# Patient Record
Sex: MALE | Race: WHITE | NOT HISPANIC OR LATINO | URBAN - METROPOLITAN AREA
[De-identification: names, ages, dates, MRNs, and addresses within clinical notes are randomized per-mention and may not be internally consistent; named-entity substitution may affect disease eponyms.]

---

## 2024-08-09 ENCOUNTER — EMERGENCY (EMERGENCY)
Facility: HOSPITAL | Age: 19
LOS: 1 days | Discharge: HOME | End: 2024-08-09
Attending: STUDENT IN AN ORGANIZED HEALTH CARE EDUCATION/TRAINING PROGRAM | Admitting: STUDENT IN AN ORGANIZED HEALTH CARE EDUCATION/TRAINING PROGRAM
Payer: SELF-PAY

## 2024-08-09 VITALS
HEART RATE: 79 BPM | OXYGEN SATURATION: 98 % | TEMPERATURE: 98 F | RESPIRATION RATE: 18 BRPM | SYSTOLIC BLOOD PRESSURE: 126 MMHG | DIASTOLIC BLOOD PRESSURE: 65 MMHG

## 2024-08-09 VITALS
DIASTOLIC BLOOD PRESSURE: 68 MMHG | TEMPERATURE: 98 F | HEART RATE: 68 BPM | RESPIRATION RATE: 18 BRPM | OXYGEN SATURATION: 95 % | SYSTOLIC BLOOD PRESSURE: 128 MMHG

## 2024-08-09 DIAGNOSIS — S67.197A CRUSHING INJURY OF LEFT LITTLE FINGER, INITIAL ENCOUNTER: ICD-10-CM
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
## 2024-08-09 DIAGNOSIS — S62.637B DISPLACED FRACTURE OF DISTAL PHALANX OF LEFT LITTLE FINGER, INITIAL ENCOUNTER FOR OPEN FRACTURE: ICD-10-CM

## 2024-08-09 DIAGNOSIS — Z23 ENCOUNTER FOR IMMUNIZATION: ICD-10-CM

## 2024-08-09 PROCEDURE — 99284 EMERGENCY DEPT VISIT MOD MDM: CPT

## 2024-08-09 PROCEDURE — 90471 IMMUNIZATION ADMIN: CPT

## 2024-08-09 PROCEDURE — 99284 EMERGENCY DEPT VISIT MOD MDM: CPT | Mod: 25

## 2024-08-09 PROCEDURE — 73140 X-RAY EXAM OF FINGER(S): CPT | Mod: 26,LT

## 2024-08-09 PROCEDURE — 90715 TDAP VACCINE 7 YRS/> IM: CPT

## 2024-08-09 PROCEDURE — 73140 X-RAY EXAM OF FINGER(S): CPT

## 2024-08-09 RX ORDER — CEPHALEXIN 250 MG
500 CAPSULE ORAL ONCE
Refills: 0 | Status: COMPLETED | OUTPATIENT
Start: 2024-08-09 | End: 2024-08-09

## 2024-08-09 RX ORDER — CLOSTRIDIUM TETANI TOXOID ANTIGEN (FORMALDEHYDE INACTIVATED), CORYNEBACTERIUM DIPHTHERIAE TOXOID ANTIGEN (FORMALDEHYDE INACTIVATED), BORDETELLA PERTUSSIS TOXOID ANTIGEN (GLUTARALDEHYDE INACTIVATED), BORDETELLA PERTUSSIS FILAMENTOUS HEMAGGLUTININ ANTIGEN (FORMALDEHYDE INACTIVATED), BORDETELLA PERTUSSIS PERTACTIN ANTIGEN, AND BORDETELLA PERTUSSIS FIMBRIAE 2/3 ANTIGEN 5; 2; 2.5; 5; 3; 5 [LF]/.5ML; [LF]/.5ML; UG/.5ML; UG/.5ML; UG/.5ML; UG/.5ML
0.5 INJECTION, SUSPENSION INTRAMUSCULAR ONCE
Refills: 0 | Status: COMPLETED | OUTPATIENT
Start: 2024-08-09 | End: 2024-08-09

## 2024-08-09 RX ORDER — LIDOCAINE HYDROCHLORIDE,EPINEPHRINE BITARTRATE 20; .005 MG/ML; MG/ML
20 INJECTION, SOLUTION EPIDURAL; INFILTRATION; INTRACAUDAL; PERINEURAL ONCE
Refills: 0 | Status: COMPLETED | OUTPATIENT
Start: 2024-08-09 | End: 2024-08-09

## 2024-08-09 RX ORDER — CEPHALEXIN 250 MG
1 CAPSULE ORAL
Qty: 40 | Refills: 0
Start: 2024-08-09 | End: 2024-08-18

## 2024-08-09 RX ADMIN — CLOSTRIDIUM TETANI TOXOID ANTIGEN (FORMALDEHYDE INACTIVATED), CORYNEBACTERIUM DIPHTHERIAE TOXOID ANTIGEN (FORMALDEHYDE INACTIVATED), BORDETELLA PERTUSSIS TOXOID ANTIGEN (GLUTARALDEHYDE INACTIVATED), BORDETELLA PERTUSSIS FILAMENTOUS HEMAGGLUTININ ANTIGEN (FORMALDEHYDE INACTIVATED), BORDETELLA PERTUSSIS PERTACTIN ANTIGEN, AND BORDETELLA PERTUSSIS FIMBRIAE 2/3 ANTIGEN 0.5 MILLILITER(S): 5; 2; 2.5; 5; 3; 5 INJECTION, SUSPENSION INTRAMUSCULAR at 16:26

## 2024-08-09 RX ADMIN — Medication 500 MILLIGRAM(S): at 16:25

## 2024-08-09 RX ADMIN — LIDOCAINE HYDROCHLORIDE,EPINEPHRINE BITARTRATE 20 MILLILITER(S): 20; .005 INJECTION, SOLUTION EPIDURAL; INFILTRATION; INTRACAUDAL; PERINEURAL at 21:01

## 2024-08-09 NOTE — ED PROVIDER NOTE - PROGRESS NOTE DETAILS
S/p ortho hand evaluation with plan for antibiotics and outpatient followup.  Plan to discharge home with return precautions and outpatient followup.

## 2024-08-09 NOTE — ED PROVIDER NOTE - NSFOLLOWUPCLINICS_GEN_ALL_ED_FT
[facility]  Family Medicine  [address], [address]  [place], [place] [address]  Phone: [phone]  Fax:

## 2024-08-09 NOTE — ED PROVIDER NOTE - OBJECTIVE STATEMENT
20 y/o M with no significant PMHx presents with crushing injury to the R 5th digit approx 22:00 last night (8/8/24). Patient was out to eat with friends and went to lean up against a metal fence that was surrounding the outdoor seating area at the restaurant. The fence was unstable and he fell backward and pinched his R 5th digit under the fence. No head-strike, no LOC. Patient cleaned wound at home and went to bed. Endorses associated pain of R 5th digit, minimal bleeding from finger. Did not want to go to urgent care/ED at first because he currently does not have active health insurance 2/2 living in [place] the last 3 years. Patient decided to go this morning to urgent care. X-ray showed comminuted significantly displaced fx of the distal phalanx. Patient denies numbness/tingling of the RUE, headache, dizziness, changes in vision, chest pain, shortness of breath, back pain, changes in bowel or bladder habits. Unknown last tetanus shot. 20 y/o M with no significant PMHx presents with crushing injury to the L 5th digit approx 22:00 last night (8/8/24). Patient was out to eat with friends and went to lean up against a metal fence that was surrounding the outdoor seating area at the restaurant. The fence was unstable and he fell backward and pinched his R 5th digit under the fence. No head-strike, no LOC. Patient cleaned wound at home and went to bed. Endorses associated pain of L 5th digit, minimal bleeding from finger. Did not want to go to urgent care/ED at first because he currently does not have active health insurance 2/2 living in [place] the last 3 years. Patient decided to go this morning to urgent care. X-ray showed comminuted significantly displaced fx of the distal phalanx of the 5th digit. Patient denies numbness/tingling of the LUE, headache, dizziness, changes in vision, chest pain, shortness of breath, back pain, changes in bowel or bladder habits. Unknown last tetanus shot.

## 2024-08-09 NOTE — ED PROVIDER NOTE - ATTENDING APP SHARED VISIT CONTRIBUTION OF CARE
19M sent with by urgent care s/p crush injury to R finger, with nail avulsion and distal finger laceration, XR at urgent care with displaced fracture, will treat as open fracture and discuss case with hand team.

## 2024-08-09 NOTE — ED ADULT NURSE REASSESSMENT NOTE - NS ED NURSE REASSESS COMMENT FT1
Laceration to left hand distal end of 5th digit, bleeding controlled.  Vital signs stble.  Cephalexin 500mg PO adminsitered,  ADACEL IM adminitered.  Xray done.  Ortho consult and laceration repair pending.

## 2024-08-09 NOTE — ED ADULT NURSE NOTE - OBJECTIVE STATEMENT
Patient w/ laceration to left hand distal end of 5th digit, states last night was at a bar and leaned against some metal divider, fell and cut finger.  Arrives w/ dressing in place from , sent to ED.  States UTD on Tetanus vaccine status.

## 2024-08-09 NOTE — ED ADULT NURSE NOTE - NSFALLUNIVINTERV_ED_ALL_ED
Bed/Stretcher in lowest position, wheels locked, appropriate side rails in place/Call bell, personal items and telephone in reach/Instruct patient to call for assistance before getting out of bed/chair/stretcher/Non-slip footwear applied when patient is off stretcher/[place] to call system/Physically safe environment - no spills, clutter or unnecessary equipment/Purposeful proactive rounding/Room/bathroom lighting operational, light cord in reach

## 2024-08-09 NOTE — ED PROVIDER NOTE - NSFOLLOWUPINSTRUCTIONS_ED_ALL_ED_FT
FOLLOW UP IN THE ORTHOPEDICS CLINIC.  CALL [phone] TO SCHEDULE AN APPOINTMENT.    ----    Finger Fracture, Adult  A finger fracture is a break in any of the finger bones. Your health care provider may put a splint or cast on your finger so it will not move while it heals.    What are the causes?  The main cause of a finger fracture is an injury from:  Sports.  A fall.  Closing your finger in a drawer or door.  What increases the risk?  Playing sports.  Working with machines.  Having a condition called osteoporosis that causes weak bones.  What are the signs or symptoms?  Pain, bruises, and swelling soon after the injury.  Stiffness.  Exposed bones, in very bad cases. This is called a compound fracture or open fracture.  How is this diagnosed?  A physical exam.  Your medical history.  Your symptoms.  An X-ray.  How is this treated?  Treatment depends on how bad your fracture is. If the bones are still in place, your provider may put your finger in a splint. If many fingers are fractured, you may need a cast. A cast may be placed up to the elbow. This will keep your fingers and hand from moving.    If the bones are out of place, your provider may move them back into place or you may need to have surgery.    You may also need to do physical therapy exercises to help your finger move better and get stronger.    Follow these instructions at home:  If you have a splint that can be taken off:    Hand showing finger splint on fingers and wrist.  Wear the splint as told by your provider. Take it off only if your provider says you can.  Check the skin around it every day. Tell your provider if you see problems.  Loosen the splint if your fingers tingle, are numb, or turn cold and blue.  Keep the splint clean and dry.  If you have a cast that cannot be taken off:    Do not put pressure on any part of the cast until it's hard. This may take a few hours.  Do not stick anything inside it to scratch your skin. Doing that raises your risk of infection.  Check the skin around the cast every day. Tell your provider if you see problems.  You may put lotion on dry skin around the cast. Do not put lotion on the skin underneath the cast.  Keep the cast clean and dry.  Bathing    Do not take baths, swim, or use a hot tub until told. Ask if showers are okay.  If your splint or cast is not waterproof:  Do not let it get wet.  Cover it when you take a bath or shower. Use a cover that does not let any water in.  Managing pain, stiffness, and swelling    If told, put ice on the area.  If you have a splint that you can take off, remove it as told.  Put ice in a plastic bag.  Place a towel between your skin and the bag, or between your cast and the bag.  Leave the ice on for 20 minutes, 2–3 times a day.  If your skin turns bright red, take off the ice right away to prevent skin damage. The risk of damage is higher if you can't feel pain, heat, or cold.  Move your fingers often to reduce stiffness and swelling.  Raise the injured area above the level of your heart while you're sitting or lying down. Use a pillow to support your hand as needed.  Activity    Ask your provider if you should avoid driving or using machines while you're taking your medicine.  Do exercises as told by your provider.  Return to normal activities when you're told. Ask what things are safe for you to do.  Ask when it's safe to drive if you have a splint or cast on your finger.  General instructions    Do not smoke, vape, or use products with nicotine or tobacco in them. These can make healing take longer after surgery. If you need help quitting, talk with your provider.  Take your medicines only as told by your provider.  Keep all follow-up visits. Your provider will need to check how your finger is healing.  Contact a health care provider if:  Your pain or swelling gets worse, even with treatment.  You have trouble moving your finger.  Get help right away if:  Your finger gets numb or turns blue.  This information is not intended to replace advice given to you by your health care provider. Make sure you discuss any questions you have with your health care provider.

## 2024-08-09 NOTE — ED PROVIDER NOTE - PATIENT PORTAL LINK FT
You can access the FollowMyHealth Patient Portal offered by [facility] by registering at the following website: [email or web address]. By joining [facility]’s FollowMyHealth portal, you will also be able to view your health information using other applications (apps) compatible with our system.

## 2024-08-10 NOTE — CHART NOTE - NSCHARTNOTEFT_GEN_A_CORE
Patient notes read and appreciated.  Patient presented to ED on 8/9 where he was seen by writer.   Patient referred to SW as SBIRT was initiated.  Patient reports drinking 3 beers while out to dinner with family.  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  Patient reports that he is home visiting for the summer.  He notes that he is in the [place] visiting family.  Patient reports that he leaned on an outdoor dining structure and fell.  As per patient, he was not intoxicated.  Patient denied feeling that he has any concerns with drinking.  Patient denied needing any ETOH related treatment.  As per patient, he is planning to return to [place] in a few weeks.    Patient denied wanting any resources.  Patient was treated and discharged without incident.

## 2024-08-10 NOTE — CONSULT NOTE ADULT - SUBJECTIVE AND OBJECTIVE BOX
Orthopaedic Surgery Consult Note    For Surgeon: Dr. Torres    HPI:  19M RHD no PMHx p/w crush injury to the L 5th digit night of 8/8/24. Patient was out to eat with friends and went to lean up against a metal fence, subsequently fell backward and landed on his R 5th digit under the fence. Denies numbness, tingling, or weakness. Denies other injuries.       PAST MEDICAL & SURGICAL HISTORY:  n/a      Vital Signs Last 24 Hrs  T(C): 36.9 (09 Aug 2024 23:04), Max: 36.9 (09 Aug 2024 23:04)  T(F): 98.4 (09 Aug 2024 23:04), Max: 98.4 (09 Aug 2024 23:04)  HR: 68 (09 Aug 2024 23:04) (68 - 79)  BP: 128/68 (09 Aug 2024 23:04) (126/65 - 128/68)  BP(mean): --  RR: 18 (09 Aug 2024 23:04) (18 - 18)  SpO2: 95% (09 Aug 2024 23:04) (95% - 98%)    Parameters below as of 09 Aug 2024 23:04  Patient On (Oxygen Delivery Method): room air        Physical Exam:  Exam showing deformity of 5th digit distal phalanx  Laceration of hyponychium <1cm.   Nail plate deformity, disrupted from eponychial fold      Imaging:   XR shows completely displaced fracture of 5th digit distal phalanx tuft      A/P: 19M with class I open fx of 5th digit distal phalanx tu    -PO Keflex  -Tdap  -betadine wash  -[facility] fracture reduction  -bedside nail plate removal / replacement  -nail bed repair  -soft dressing  -alumifoam splint  -NWB LUE  -f/u outpt    -Discussed with Dr. Mae Pager [phone]

## 2024-08-13 PROBLEM — Z00.00 ENCOUNTER FOR PREVENTIVE HEALTH EXAMINATION: Status: ACTIVE | Noted: 2024-08-13

## 2024-08-15 ENCOUNTER — APPOINTMENT (OUTPATIENT)
Dept: ORTHOPEDIC SURGERY | Facility: CLINIC | Age: 19
End: 2024-08-15
Payer: SELF-PAY

## 2024-08-15 ENCOUNTER — TRANSCRIPTION ENCOUNTER (OUTPATIENT)
Age: 19
End: 2024-08-15

## 2024-08-15 DIAGNOSIS — S69.90XA UNSPECIFIED INJURY OF UNSPECIFIED WRIST, HAND AND FINGER(S), INITIAL ENCOUNTER: ICD-10-CM

## 2024-08-15 PROCEDURE — 99203 OFFICE O/P NEW LOW 30 MIN: CPT

## 2024-08-17 PROBLEM — S69.90XA FINGER INJURY: Status: ACTIVE | Noted: 2024-08-17

## 2024-08-17 NOTE — DISCUSSION/SUMMARY
[de-identified] : 19M RHD with L open joaquín fx. Stitches removed with digital block with 1% lidocaine. - patient encouraged to start frequent ROM exercises of the other digits, wrist and elbow to prevent stiffness - complete course of PO abx,  - continue aluminum foam splint for another 2 weeks - patient can dc hand soft dressings and start frequent gentle soaks with gentle soap and redress at home - encouraged to f/u with ortho in [place] for repeat eval, possible surgical discussion, and xrays and to start OT for the digit

## 2024-08-17 NOTE — PHYSICAL EXAM
[de-identified] : L fifth digit in aluminum foam splint wound is dry and crusting over as expected, nylon sutures visible, no signs of infection limited motion of the dipj, given the pain, pipj flex to 0-60, MCP 0-80 sensation intact to radial, ulnar, dorsal volar surfaces brisk cap refill active extension/flexion of the DIPJ present

## 2024-08-17 NOTE — HISTORY OF PRESENT ILLNESS
[de-identified] : BHAVNA TORRES is a 19 year old male being seen for follow up of a left 5th distal phalanx open joaquín fx (DOI: 8/9/24 that was repaired with stitches and splinted in [facility] ED on 8/9/24. He has been compliant with PO abx and hand elevation. He presents today for suture removal. He is visiting from [place] and will be returning to [place] in one week. Denies numbness and tingling.

## 2024-08-17 NOTE — PHYSICAL EXAM
[de-identified] : L fifth digit in aluminum foam splint wound is dry and crusting over as expected, nylon sutures visible, no signs of infection limited motion of the dipj, given the pain, pipj flex to 0-60, MCP 0-80 sensation intact to radial, ulnar, dorsal volar surfaces brisk cap refill active extension/flexion of the DIPJ present

## 2024-08-17 NOTE — HISTORY OF PRESENT ILLNESS
[de-identified] : BHAVNA TORRES is a 19 year old male being seen for follow up of a left 5th distal phalanx open joaquín fx (DOI: 8/9/24 that was repaired with stitches and splinted in [facility] ED on 8/9/24. He has been compliant with PO abx and hand elevation. He presents today for suture removal. He is visiting from [place] and will be returning to [place] in one week. Denies numbness and tingling.

## 2024-08-17 NOTE — DISCUSSION/SUMMARY
[de-identified] : 19M RHD with L open joaquín fx. Stitches removed with digital block with 1% lidocaine. - patient encouraged to start frequent ROM exercises of the other digits, wrist and elbow to prevent stiffness - complete course of PO abx,  - continue aluminum foam splint for another 2 weeks - patient can dc hand soft dressings and start frequent gentle soaks with gentle soap and redress at home - encouraged to f/u with ortho in [place] for repeat eval, possible surgical discussion, and xrays and to start OT for the digit  Yes